# Patient Record
Sex: FEMALE | Race: WHITE
[De-identification: names, ages, dates, MRNs, and addresses within clinical notes are randomized per-mention and may not be internally consistent; named-entity substitution may affect disease eponyms.]

---

## 2017-08-24 ENCOUNTER — HOSPITAL ENCOUNTER (OUTPATIENT)
Dept: HOSPITAL 76 - DI.S | Age: 77
Discharge: HOME | End: 2017-08-24
Attending: PHYSICIAN ASSISTANT
Payer: MEDICARE

## 2017-08-24 DIAGNOSIS — Z12.31: Primary | ICD-10-CM

## 2017-08-24 PROCEDURE — 77067 SCR MAMMO BI INCL CAD: CPT

## 2017-08-25 NOTE — MAMMOGRAPHY REPORT
DIGITAL SCREENING MAMMOGRAM:  08/24/2017

 

CLINICAL INDICATION:  A 77-year-old for screening.

 

COMPARISON:  07/2016, 07/2015, 06/2014, 05/2013, 03/2012, 03/2011, 03/2010

 

TECHNIQUE:  Routine CC and MLO projections were obtained of the breasts.  

 

FINDINGS:  Parenchymal tissue within both breasts is heterogeneously dense, which may lower the sensi
tivity of mammography; however, there are no dominant masses, suspicious microcalcifications, or seco
ndary signs of malignancy. In comparison to the previous studies, there are no significant changes.

 

ASSESSMENT:  NO MAMMOGRAPHIC EVIDENCE OF MALIGNANCY. NO SIGNIFICANT INTERVAL CHANGES. 

 

RECOMMENDATION:  Screening mammography is recommended annually.

 

BIRADS category 1 - negative.

 

STANDARD QUALIFYING STATEMENTS

1. This examination was reviewed with the aid of Computed-Aided Detection (CAD).

2. A negative or benign imaging report should not delay biopsy if clinically suspicious findings are 
present. Consider surgical consultation if warranted. More than 5% of cancers are not identified by i
maging.

3. Dense breasts may obscure an underlying neoplasm.

 

 

 

DD:08/25/2017 13:01:47  DT: 08/25/2017 13:15  JOB #: [id number]  EXT JOB #:[id number]

## 2017-09-28 ENCOUNTER — HOSPITAL ENCOUNTER (OUTPATIENT)
Dept: HOSPITAL 76 - LAB.F | Age: 77
Discharge: HOME | End: 2017-09-28
Attending: PHYSICIAN ASSISTANT
Payer: MEDICARE

## 2017-09-28 DIAGNOSIS — R35.1: ICD-10-CM

## 2017-09-28 DIAGNOSIS — E78.5: ICD-10-CM

## 2017-09-28 DIAGNOSIS — Z51.81: Primary | ICD-10-CM

## 2017-09-28 DIAGNOSIS — E55.9: ICD-10-CM

## 2017-09-28 LAB
ALBUMIN/GLOB SERPL: 1.6 {RATIO} (ref 1–2.2)
ANION GAP SERPL CALCULATED.4IONS-SCNC: 6 MMOL/L (ref 6–13)
BASOPHILS NFR BLD AUTO: 0 10^3/UL (ref 0–0.1)
BASOPHILS NFR BLD AUTO: 0.6 %
BILIRUB BLD-MCNC: 0.7 MG/DL (ref 0.2–1)
BUN SERPL-MCNC: 15 MG/DL (ref 6–20)
CALCIUM UR-MCNC: 8.9 MG/DL (ref 8.5–10.3)
CHLORIDE SERPL-SCNC: 103 MMOL/L (ref 101–111)
CHOLEST SERPL-MCNC: 180 MG/DL
CO2 SERPL-SCNC: 30 MMOL/L (ref 21–32)
CREAT SERPLBLD-SCNC: 0.7 MG/DL (ref 0.4–1)
CUL URINE ADD CHARGE: (no result)
EOSINOPHIL # BLD AUTO: 0.3 10^3/UL (ref 0–0.7)
EOSINOPHIL NFR BLD AUTO: 5.8 %
ERYTHROCYTE [DISTWIDTH] IN BLOOD BY AUTOMATED COUNT: 12.5 % (ref 12–15)
GFRSERPLBLD MDRD-ARVRAT: 81 ML/MIN/{1.73_M2} (ref 89–?)
GLOBULIN SER-MCNC: 2.5 G/DL (ref 2.1–4.2)
GLUCOSE SERPL-MCNC: 80 MG/DL (ref 70–100)
HCT VFR BLD AUTO: 43.8 % (ref 37–47)
HDLC SERPL-MCNC: 71 MG/DL
HDLC SERPL: 2.5 {RATIO} (ref ?–4.4)
HGB UR QL STRIP: 14.9 G/DL (ref 12–16)
LDLC/HDLC SERPL: 1.4 {RATIO} (ref ?–4.4)
LYMPHOCYTES # SPEC AUTO: 1.2 10^3/UL (ref 1.5–3.5)
LYMPHOCYTES NFR BLD AUTO: 24.5 %
MCH RBC QN AUTO: 32.4 PG (ref 27–31)
MCHC RBC AUTO-ENTMCNC: 34.1 G/DL (ref 32–36)
MCV RBC AUTO: 95 FL (ref 81–99)
MONOCYTES # BLD AUTO: 0.4 10^3/UL (ref 0–1)
MONOCYTES NFR BLD AUTO: 8.6 %
NEUTROPHILS # BLD AUTO: 3 10^3/UL (ref 1.5–6.6)
NEUTROPHILS # SNV AUTO: 4.9 X10^3/UL (ref 4.8–10.8)
NEUTROPHILS NFR BLD AUTO: 60.5 %
NRBC # BLD AUTO: 0.2 /100WBC
PDW BLD AUTO: 8.4 FL (ref 7.9–10.8)
PH UR STRIP.AUTO: 7 PH (ref 5–7.5)
POTASSIUM SERPL-SCNC: 4.1 MMOL/L (ref 3.5–5)
PROT SPEC-MCNC: 6.5 G/DL (ref 6.7–8.2)
RBC MAR: 4.6 10^6/UL (ref 4.2–5.4)
SODIUM SERPLBLD-SCNC: 139 MMOL/L (ref 135–145)
SP GR UR STRIP.AUTO: 1.01 (ref 1–1.03)
TRIGL P FAST SERPL-MCNC: 47 MG/DL
UR CULTURE IF IND: (no result)
UROBILINOGEN UR STRIP.AUTO-MCNC: NEGATIVE MG/DL
VLDLC SERPL-SCNC: 9 MG/DL
WBC # BLD: 4.9 X10^3/UL
WBC # UR MANUAL: (no result) /HPF (ref 0–5)

## 2017-09-28 PROCEDURE — 87086 URINE CULTURE/COLONY COUNT: CPT

## 2017-09-28 PROCEDURE — 85025 COMPLETE CBC W/AUTO DIFF WBC: CPT

## 2017-09-28 PROCEDURE — 81001 URINALYSIS AUTO W/SCOPE: CPT

## 2017-09-28 PROCEDURE — 80053 COMPREHEN METABOLIC PANEL: CPT

## 2017-09-28 PROCEDURE — 36415 COLL VENOUS BLD VENIPUNCTURE: CPT

## 2017-09-28 PROCEDURE — 80061 LIPID PANEL: CPT

## 2017-09-28 PROCEDURE — 82306 VITAMIN D 25 HYDROXY: CPT

## 2017-10-05 ENCOUNTER — HOSPITAL ENCOUNTER (EMERGENCY)
Dept: HOSPITAL 76 - ED | Age: 77
Discharge: HOME | End: 2017-10-05
Payer: MEDICARE

## 2017-10-05 ENCOUNTER — HOSPITAL ENCOUNTER (OUTPATIENT)
Dept: HOSPITAL 76 - DI | Age: 77
Discharge: HOME | End: 2017-10-05
Attending: PHYSICIAN ASSISTANT
Payer: MEDICARE

## 2017-10-05 VITALS — DIASTOLIC BLOOD PRESSURE: 75 MMHG | SYSTOLIC BLOOD PRESSURE: 126 MMHG

## 2017-10-05 DIAGNOSIS — I45.2: ICD-10-CM

## 2017-10-05 DIAGNOSIS — M81.0: Primary | ICD-10-CM

## 2017-10-05 DIAGNOSIS — I48.0: Primary | ICD-10-CM

## 2017-10-05 LAB
ALBUMIN/GLOB SERPL: 1.9 {RATIO} (ref 1–2.2)
ANION GAP SERPL CALCULATED.4IONS-SCNC: 13 MMOL/L (ref 6–13)
BASOPHILS NFR BLD AUTO: 0 10^3/UL (ref 0–0.1)
BASOPHILS NFR BLD AUTO: 0.3 %
BILIRUB BLD-MCNC: 0.8 MG/DL (ref 0.2–1)
BUN SERPL-MCNC: 19 MG/DL (ref 6–20)
CALCIUM UR-MCNC: 9.4 MG/DL (ref 8.5–10.3)
CHLORIDE SERPL-SCNC: 104 MMOL/L (ref 101–111)
CO2 SERPL-SCNC: 23 MMOL/L (ref 21–32)
CREAT SERPLBLD-SCNC: 0.9 MG/DL (ref 0.4–1)
EOSINOPHIL # BLD AUTO: 0.1 10^3/UL (ref 0–0.7)
EOSINOPHIL NFR BLD AUTO: 1.8 %
ERYTHROCYTE [DISTWIDTH] IN BLOOD BY AUTOMATED COUNT: 12.6 % (ref 12–15)
GFRSERPLBLD MDRD-ARVRAT: 61 ML/MIN/{1.73_M2} (ref 89–?)
GLOBULIN SER-MCNC: 2.3 G/DL (ref 2.1–4.2)
GLUCOSE SERPL-MCNC: 150 MG/DL (ref 70–100)
HCT VFR BLD AUTO: 45.4 % (ref 37–47)
HGB UR QL STRIP: 15.3 G/DL (ref 12–16)
LIPASE SERPL-CCNC: 36 U/L (ref 22–51)
LYMPHOCYTES # SPEC AUTO: 1.2 10^3/UL (ref 1.5–3.5)
LYMPHOCYTES NFR BLD AUTO: 17.2 %
MCH RBC QN AUTO: 32.1 PG (ref 27–31)
MCHC RBC AUTO-ENTMCNC: 33.7 G/DL (ref 32–36)
MCV RBC AUTO: 95.2 FL (ref 81–99)
MONOCYTES # BLD AUTO: 0.4 10^3/UL (ref 0–1)
MONOCYTES NFR BLD AUTO: 5.6 %
NEUTROPHILS # BLD AUTO: 5.4 10^3/UL (ref 1.5–6.6)
NEUTROPHILS # SNV AUTO: 7.1 X10^3/UL (ref 4.8–10.8)
NEUTROPHILS NFR BLD AUTO: 75.1 %
NRBC # BLD AUTO: 0 /100WBC
PDW BLD AUTO: 8 FL (ref 7.9–10.8)
POTASSIUM SERPL-SCNC: 3.3 MMOL/L (ref 3.5–5)
PROT SPEC-MCNC: 6.7 G/DL (ref 6.7–8.2)
RBC MAR: 4.77 10^6/UL (ref 4.2–5.4)
SODIUM SERPLBLD-SCNC: 140 MMOL/L (ref 135–145)
WBC # BLD: 7.1 X10^3/UL

## 2017-10-05 PROCEDURE — 93005 ELECTROCARDIOGRAM TRACING: CPT

## 2017-10-05 PROCEDURE — 83735 ASSAY OF MAGNESIUM: CPT

## 2017-10-05 PROCEDURE — 80053 COMPREHEN METABOLIC PANEL: CPT

## 2017-10-05 PROCEDURE — 99284 EMERGENCY DEPT VISIT MOD MDM: CPT

## 2017-10-05 PROCEDURE — 77080 DXA BONE DENSITY AXIAL: CPT

## 2017-10-05 PROCEDURE — 85025 COMPLETE CBC W/AUTO DIFF WBC: CPT

## 2017-10-05 PROCEDURE — 36415 COLL VENOUS BLD VENIPUNCTURE: CPT

## 2017-10-05 PROCEDURE — 84443 ASSAY THYROID STIM HORMONE: CPT

## 2017-10-05 PROCEDURE — 96374 THER/PROPH/DIAG INJ IV PUSH: CPT

## 2017-10-05 PROCEDURE — 84484 ASSAY OF TROPONIN QUANT: CPT

## 2017-10-05 PROCEDURE — 83690 ASSAY OF LIPASE: CPT

## 2017-10-05 NOTE — ED PHYSICIAN DOCUMENTATION
PD HPI CHEST PAIN





- Stated complaint


Stated Complaint: CHEST TIGHTNESS/DIZZY





- Chief complaint


Chief Complaint: Cardiac





- History obtained from


History obtained from: Patient





- History of Present Illness


Timing - onset: Other (77-year-old woman was at [employer] today walking hard when 

she developed central nonradiating chest tightness with presyncope but no 

shortness of breath, nausea, or sweats.  It lasted for about 45 minutes and now 

feels better.  No heart history.  No recent travel, no dyspnea, no pedal edema 

or calf pain.)





Review of Systems


Constitutional: denies: Fever, Chills


Throat: denies: Dental pain / toothache, Sore throat


Cardiac: denies: Palpitations, Pedal edema, Calf pain


Respiratory: denies: Dyspnea, Cough, Hemoptysis, Wheezing





PD PAST MEDICAL HISTORY





- Present Medications


Home Medications: 


 Ambulatory Orders











 Medication  Instructions  Recorded  Confirmed


 


Acetaminophen [Tylenol Extra 1,000 mg PO DAILY 10/05/17 10/05/17





Strength]   


 


Atorvastatin [Lipitor] 20 mg PO DAILY 10/05/17 10/05/17


 


HYDROcod/ACETAM 5/325 [[place] 5/325]  10/05/17 


 


Ibuprofen [Advil] 400 mg PO DAILY 10/05/17 10/05/17














- Allergies


Allergies/Adverse Reactions: 


 Allergies











Allergy/AdvReac Type Severity Reaction Status Date / Time


 


No Known Drug Allergies Allergy   Verified 10/05/17 13:21














PD ED PE NORMAL





- Vitals


Vital signs reviewed: Yes





- General


General: Alert and oriented X 3, No acute distress





- Cardiac


Cardiac: RRR, No murmur, Other (On my examination she has a regular rate and 

rhythm despite being in atrial fibrillation on the EKG, this is converted to 

sinus borderline tachycardia with about 105 rate spontaneously.)





- Respiratory


Respiratory: No respiratory distress, Clear bilaterally





- Abdomen


Abdomen: Normal bowel sounds, Soft, Non tender





- Extremities


Extremities: No edema, No calf tenderness / cord





- Neuro


Neuro: Alert and oriented X 3, Normal speech





- Psych


Psych: Normal mood, Normal affect





Results





- Vitals


Vitals: 


 Vital Signs - 24 hr











  10/05/17 10/05/17 10/05/17





  13:11 14:10 14:51


 


Temperature 36.3 C L  


 


Heart Rate 100 80 81


 


Respiratory 16 20 14





Rate   


 


Blood Pressure 142/80 H 103/69 125/75


 


O2 Saturation 99 97 100














  10/05/17





  15:03


 


Temperature 


 


Heart Rate 81


 


Respiratory 18





Rate 


 


Blood Pressure 126/75


 


O2 Saturation 100








 Oxygen











O2 Source                      Room air

















- EKG (time done)


  ** 1334


Rate: Rate (enter#) (127)


Rhythm: Atrial fibrillation


Intervals: RBBB


Ischemia: Non specific changes


Compare to prior EKG: Old EKG unavailable


Computer interpretation: Agree with computer





  ** 1405


Rate: Rate (enter#) (83)


Rhythm: NSR


Intervals: Prolonged AL, RBBB, LBBB (LAFB)


Ischemia: No: ST elevation c/w ischemia


Computer interpretation: Agree with computer





- Labs


Labs: 


 Laboratory Tests











  10/05/17 10/05/17 10/05/17





  13:38 13:38 13:38


 


WBC  7.1  


 


RBC  4.77  


 


Hgb  15.3  


 


Hct  45.4  


 


MCV  95.2  


 


MCH  32.1 H  


 


MCHC  33.7  


 


RDW  12.6  


 


Plt Count  222  


 


MPV  8.0  


 


Neut #  5.4  


 


Lymph #  1.2 L  


 


Mono #  0.4  


 


Eos #  0.1  


 


Baso #  0.0  


 


Absolute Nucleated RBC  0.00  


 


Nucleated RBC %  0.0  


 


Sodium   140 


 


Potassium   3.3 L 


 


Chloride   104 


 


Carbon Dioxide   23 


 


Anion Gap   13.0 


 


BUN   19 


 


Creatinine   0.9 


 


Estimated GFR (MDRD)   61 L 


 


Glucose   150 H 


 


Calcium   9.4 


 


Magnesium    2.2


 


Total Bilirubin   0.8 


 


AST   35 


 


ALT   23 


 


Alkaline Phosphatase   59 


 


Troponin I   


 


Total Protein   6.7 


 


Albumin   4.4 


 


Globulin   2.3 


 


Albumin/Globulin Ratio   1.9 


 


Lipase   36 


 


TSH   














  10/05/17 10/05/17





  13:38 13:38


 


WBC  


 


RBC  


 


Hgb  


 


Hct  


 


MCV  


 


MCH  


 


MCHC  


 


RDW  


 


Plt Count  


 


MPV  


 


Neut #  


 


Lymph #  


 


Mono #  


 


Eos #  


 


Baso #  


 


Absolute Nucleated RBC  


 


Nucleated RBC %  


 


Sodium  


 


Potassium  


 


Chloride  


 


Carbon Dioxide  


 


Anion Gap  


 


BUN  


 


Creatinine  


 


Estimated GFR (MDRD)  


 


Glucose  


 


Calcium  


 


Magnesium  


 


Total Bilirubin  


 


AST  


 


ALT  


 


Alkaline Phosphatase  


 


Troponin I  < 0.04 


 


Total Protein  


 


Albumin  


 


Globulin  


 


Albumin/Globulin Ratio  


 


Lipase  


 


TSH   1.51














PD MEDICAL DECISION MAKING





- ED course


ED course: 





77-year-old woman with atrial fibrillation episode causing chest tightness and 

dizziness which has resolved.





Repeat EKG shows sinus rhythm, she was modestly tachycardic, she was 

administered 5 mg of Lopressor IV and her heart rate was about 80.





DVT/PE was considered, but given alternative diagnosis of intermittent atrial 

fibrillation and lack of dyspnea this is very unlikely.





Potassium was repleted orally.





Departure





- Departure


Disposition: 01 Home, Self Care


Clinical Impression: 


Atrial fibrillation


Qualifiers:


 Atrial fibrillation type: paroxysmal Qualified Code(s): I48.0 - Paroxysmal 

atrial fibrillation





Condition: Good


Record reviewed to determine appropriate education?: Yes


Instructions:  Atrial Fibrillation Dc


Comments: 


Follow-up with your physician for further evaluation and treatment, return if 

worse.  Take a baby aspirin a day until you follow-up with your physician.


Discharge Date/Time: 10/05/17 15:07

## 2017-10-05 NOTE — DEXA REPORT
DEXA SCAN:  10/05/2017 



CLINICAL INDICATION: Postmenopausal.



TECHNIQUE: Dual energy x-ray absorptiometry (DXA) was performed on a 800APP 
system. Regions measured are the AP spine, femoral neck, and, if needed, 
forearm.



COMPARISON: None. In accordance with the International Society for Clinical 
Densitometry (ISCD) guidelines, data from previous exams may be reanalyzed 
using current recommendations and techniques. This is done to allow a more 
accurate basis for comparison with the current study.



FINDINGS

The data for the lumbar spine is as follows:







REGION BMD (g/cm/cm) T-SCORE Z-SCORE

 

L1 1.006 -1.0 1.0

 

L2 0.997 -1.7 0.4

 

L3 0.910 -2.4 -0.4

 

L4 1.051 -1.2 0.8

 

TOTAL 0.989 -1.6 0.5







NOTE: All evaluable vertebrae are used for classification.



The data for the hip is as follows:







REGION BMD (g/cm/cm) T-SCORE Z-SCORE

 

Neck 0.666 -2.7 -0.5

 

TOTAL 0.698 -2.5 -0.4





NOTE: The femoral neck or total proximal femur, whichever is lowest, is used 
for classification.



IMPRESSION: THE WHO CLASSIFICATION BASED ON THE INTERNATIONAL REFERENCE 
STANDARD IS OSTEOPOROSIS. THE FRACTURE RISK IS HIGH.



RECOMMENDATION: Patients with diagnosis of osteoporosis or osteopenia should 
have regular bone mineral density assessment. For those eligible for Medicare, 
routine testing is allowed once every 2 years. Testing frequency can be 
increased for patients who have rapidly progressing disease or for those who 
are receiving medical therapy to restore bone mass. 



COMMENT: World Health Organization (WHO) definitions for osteoporosis and 
osteopenia:

NORMAL BMD: T-score at -1.0 or higher, fracture risk is low.

OSTEOPENIA BMD: T-score between -1.0 and -2.5, fracture risk is increased.

OSTEOPOROSIS BMD: T-score at -2.5 or lower, fracture risk high.



National Osteoporosis Foundation recommends:

1. Obtain adequate dietary calcium (at least 1200 mg per day) and vitamin D (400
-800 international units per day).

2. Participate, as appropriate, in regular weightbearing and muscle-
strengthening exercise. 

3. Avoid tobacco use and reduce alcohol and caffeine intake. 

4. For more detailed information see the website at www.NOF.org.
MTDD

## 2018-05-27 ENCOUNTER — HOSPITAL ENCOUNTER (EMERGENCY)
Dept: HOSPITAL 76 - ED | Age: 78
Discharge: HOME | End: 2018-05-27
Payer: MEDICARE

## 2018-05-27 VITALS — SYSTOLIC BLOOD PRESSURE: 144 MMHG | DIASTOLIC BLOOD PRESSURE: 74 MMHG
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
## 2018-05-27 DIAGNOSIS — S90.122A: Primary | ICD-10-CM

## 2018-05-27 DIAGNOSIS — E78.00: ICD-10-CM

## 2018-05-27 PROCEDURE — 73660 X-RAY EXAM OF TOE(S): CPT

## 2018-05-27 PROCEDURE — 99282 EMERGENCY DEPT VISIT SF MDM: CPT

## 2018-05-27 NOTE — ED PHYSICIAN DOCUMENTATION
PD HPI LOWER EXT INJURY





- Stated complaint


Stated Complaint: L FOOT INJ





- Chief complaint


Chief Complaint: Ext Problem





- History obtained from


History obtained from: Patient





- History of Present Illness


PD HPI LOW EXT INJURY LOCATION: Left, Toe (middle)


Type of injury: Blunt / blow


Where injury occurred: Home


Timing - onset: How many weeks ago (1)


Timing - duration: Weeks (1)


Timing - details: Abrupt onset, Still present


Improved by: Rest, Ice, Immobilization


Worsened by: Moving, Palpating


Associated symptoms: Swelling, Discolored


Contributing factors: No: Anticoagulated


Similar symptoms before: Has not had sx before


Recently seen: Not recently seen





- Additional information


Additional information: 


77-year-old female stubbed her toe one week ago she had a lot of swelling and 

pain associated with this and she has been able to walk on it she continues to 

have pain and now she is worried that maybe there is a fracture there.  She was 

expecting the pain to be resolved by now.








Review of Systems


Constitutional: denies: Fever


Respiratory: denies: Cough


GI: denies: Vomiting


Musculoskeletal: reports: Joint pain, Pain with weight bearing


Neurologic: denies: Generalized weakness, Focal weakness, Numbness





PD PAST MEDICAL HISTORY





- Past Medical History


Cardiovascular: High cholesterol





- Present Medications


Home Medications: 


 Ambulatory Orders











 Medication  Instructions  Recorded  Confirmed


 


Acetaminophen [Tylenol Extra 1,000 mg PO DAILY 10/05/17 10/05/17





Strength]   


 


Atorvastatin [Lipitor] 20 mg PO DAILY 10/05/17 10/05/17


 


HYDROcod/ACETAM 5/325 [[place] 5/325]  10/05/17 


 


Aspirin 81 mg PO 05/27/18 


 


Baclofen 10 mg PO 05/27/18 


 


Melatonin 3 mg PO 05/27/18 


 


Meriva 500 05/27/18 


 


Multivitamin [Multivitamins] 1 each PO 05/27/18 


 


Oxybutynin [Ditropan] 5 mg PO BID 05/27/18 05/27/18














- Allergies


Allergies/Adverse Reactions: 


 Allergies











Allergy/AdvReac Type Severity Reaction Status Date / Time


 


Sulfa (Sulfonamide Allergy  Nausea Verified 05/27/18 10:13





Antibiotics)     














- Social History


Does the pt smoke?: No


Smoking Status: Never smoker





PD ED PE NORMAL





- Vitals


Vital signs reviewed: Yes (normal )





- General


General: Alert and oriented X 3, No acute distress, Well developed/nourished





- HEENT


HEENT: Atraumatic, PERRL, EOMI





- Neck


Neck: Supple, no meningeal sign





- Respiratory


Respiratory: No respiratory distress





- Derm


Derm: Normal color, Warm and dry, No rash





- Extremities


Extremities: Other (there is ecchymosis to the 3rd toe on the left foot this 

extends to the dorsum of the foot. There is corresponding tenderness and no 

crepitance. distal n/v is intact. )





- Neuro


Neuro: No motor deficit, No sensory deficit


Eye Opening: Spontaneous


Motor: Obeys Commands


Verbal: Oriented


GCS Score: 15





- Psych


Psych: Normal mood, Normal affect





Results





- Vitals


Vitals: 


 Vital Signs - 24 hr











  05/27/18





  10:10


 


Temperature 36.3 C L


 


Heart Rate 65


 


Respiratory 18





Rate 


 


Blood Pressure 144/74 H


 


O2 Saturation 99








 Oxygen











O2 Source                      Room air

















- Rads (name of study)


  ** toes


Radiology: Prelim report reviewed (Impression: 1.  No acute osseous abnormality 

2. mild DJD, osteopenia.), EMP read indepedently, See rad report





PD MEDICAL DECISION MAKING





- ED course


Complexity details: reviewed results, re-evaluated patient, considered 

differential, d/w patient, d/w family


ED course: 


77-year-old female who stubbed her toe still has pain is concerned about a 

fracture satisfied that she does not have a fracture after reviewing her films.

  She states that she feels that she can walk on this adequately and that she 

does not need pain medication.








Departure





- Departure


Disposition: 01 Home, Self Care


Clinical Impression: 


Toe contusion


Qualifiers:


 Encounter type: initial encounter Toe: lesser toe Damage to nail status: 

without damage Laterality: left Qualified Code(s): S90.122A - Contusion of left 

lesser toe(s) without damage to nail, initial encounter





Condition: Stable


Instructions:  ED Contusion Foot


Follow-Up: 


Paris Ramsay PA-C [Primary Care Provider] - 


Discharge Date/Time: 05/27/18 11:27

## 2018-05-27 NOTE — XRAY PRELIMINARY REPORT
Accession: [id number]

Exam: XR TOE(S) LT

 

IMPRESSION: 

1. No acute osseous abnormality. 

2. Mild DJD. Osteopenia.

 

[facility]

 

SITE ID: [id number]

## 2018-05-27 NOTE — XRAY REPORT
EXAM:

LEFT TOE RADIOGRAPHY

 

EXAM DATE: 5/27/2018 10:38 AM.

 

CLINICAL HISTORY: Trauma.

 

COMPARISON: None.

 

TECHNIQUE: 3 views.

 

FINDINGS: 

Bones: No fractures or bone lesions. Bones appear osteopenic.

 

Joints: Mild DJD at the small joints of the foot.

 

Soft Tissues: Unremarkable.

 

IMPRESSION: 

1. No acute osseous abnormality. 

2. Mild DJD. Osteopenia.

 

[facility]

Referring Provider Line: [phone]

 

SITE ID: [id number]

## 2018-06-05 ENCOUNTER — HOSPITAL ENCOUNTER (OUTPATIENT)
Dept: HOSPITAL 76 - LAB.F | Age: 78
Discharge: HOME | End: 2018-06-05
Attending: PHYSICIAN ASSISTANT
Payer: MEDICARE

## 2018-06-05 DIAGNOSIS — E78.5: ICD-10-CM

## 2018-06-05 DIAGNOSIS — E55.9: ICD-10-CM

## 2018-06-05 DIAGNOSIS — R68.83: ICD-10-CM

## 2018-06-05 DIAGNOSIS — R53.83: ICD-10-CM

## 2018-06-05 DIAGNOSIS — E87.6: Primary | ICD-10-CM

## 2018-06-05 LAB
ALBUMIN DIAFP-MCNC: 3.6 G/DL (ref 3.2–5.5)
ALBUMIN/GLOB SERPL: 1.5 {RATIO} (ref 1–2.2)
ALP SERPL-CCNC: 51 IU/L (ref 42–121)
ALT SERPL W P-5'-P-CCNC: 21 IU/L (ref 10–60)
ANION GAP SERPL CALCULATED.4IONS-SCNC: 6 MMOL/L (ref 6–13)
AST SERPL W P-5'-P-CCNC: 26 IU/L (ref 10–42)
BASOPHILS NFR BLD AUTO: 0 10^3/UL (ref 0–0.1)
BASOPHILS NFR BLD AUTO: 1.1 %
BILIRUB BLD-MCNC: 0.9 MG/DL (ref 0.2–1)
BUN SERPL-MCNC: 19 MG/DL (ref 6–20)
CALCIUM UR-MCNC: 8.6 MG/DL (ref 8.5–10.3)
CHLORIDE SERPL-SCNC: 101 MMOL/L (ref 101–111)
CHOLEST SERPL-MCNC: 176 MG/DL
CO2 SERPL-SCNC: 28 MMOL/L (ref 21–32)
CREAT SERPLBLD-SCNC: 0.7 MG/DL (ref 0.4–1)
EOSINOPHIL # BLD AUTO: 0.2 10^3/UL (ref 0–0.7)
EOSINOPHIL NFR BLD AUTO: 5.2 %
ERYTHROCYTE [DISTWIDTH] IN BLOOD BY AUTOMATED COUNT: 12.6 % (ref 12–15)
GFRSERPLBLD MDRD-ARVRAT: 81 ML/MIN/{1.73_M2} (ref 89–?)
GLOBULIN SER-MCNC: 2.4 G/DL (ref 2.1–4.2)
GLUCOSE SERPL-MCNC: 82 MG/DL (ref 70–100)
HDLC SERPL-MCNC: 58 MG/DL
HDLC SERPL: 3 {RATIO} (ref ?–4.4)
HGB UR QL STRIP: 14.4 G/DL (ref 12–16)
LDLC SERPL CALC-MCNC: 103 MG/DL
LDLC/HDLC SERPL: 1.8 {RATIO} (ref ?–4.4)
LYMPHOCYTES # SPEC AUTO: 0.9 10^3/UL (ref 1.5–3.5)
LYMPHOCYTES NFR BLD AUTO: 20.3 %
MCH RBC QN AUTO: 33.3 PG (ref 27–31)
MCHC RBC AUTO-ENTMCNC: 34.3 G/DL (ref 32–36)
MCV RBC AUTO: 97.3 FL (ref 81–99)
MONOCYTES # BLD AUTO: 0.4 10^3/UL (ref 0–1)
MONOCYTES NFR BLD AUTO: 9.9 %
NEUTROPHILS # BLD AUTO: 2.7 10^3/UL (ref 1.5–6.6)
NEUTROPHILS # SNV AUTO: 4.3 X10^3/UL (ref 4.8–10.8)
NEUTROPHILS NFR BLD AUTO: 63.5 %
PDW BLD AUTO: 8.9 FL (ref 7.9–10.8)
PLATELET # BLD: 207 10^3/UL (ref 130–450)
PROT SPEC-MCNC: 6 G/DL (ref 6.7–8.2)
RBC MAR: 4.32 10^6/UL (ref 4.2–5.4)
SODIUM SERPLBLD-SCNC: 135 MMOL/L (ref 135–145)
VLDLC SERPL-SCNC: 15 MG/DL

## 2018-06-05 PROCEDURE — 36415 COLL VENOUS BLD VENIPUNCTURE: CPT

## 2018-06-05 PROCEDURE — 80061 LIPID PANEL: CPT

## 2018-06-05 PROCEDURE — 85025 COMPLETE CBC W/AUTO DIFF WBC: CPT

## 2018-06-05 PROCEDURE — 83721 ASSAY OF BLOOD LIPOPROTEIN: CPT

## 2018-06-05 PROCEDURE — 80053 COMPREHEN METABOLIC PANEL: CPT

## 2018-06-05 PROCEDURE — 84443 ASSAY THYROID STIM HORMONE: CPT

## 2018-06-05 PROCEDURE — 82306 VITAMIN D 25 HYDROXY: CPT

## 2018-06-26 ENCOUNTER — HOSPITAL ENCOUNTER (OUTPATIENT)
Dept: HOSPITAL 76 - DI | Age: 78
Discharge: HOME | End: 2018-06-26
Attending: PODIATRIST
Payer: MEDICARE

## 2018-06-26 DIAGNOSIS — S99.922A: Primary | ICD-10-CM

## 2018-06-26 NOTE — XRAY REPORT
Procedure Date:  06/26/2018   

Accession Number:  [id number] / [id number]                    

Procedure:  XR  - Foot 3 View LT CPT Code:  

 

FULL RESULT:

 

 

EXAM: Foot 3 View LT

 

DATE: 6/26/2018 3:47 PM

 

CLINICAL HISTORY: INJURY 3RD TOE LEFT, 3RD MT NECK PAIN LEFT

 

COMPARISON: Left toes 5/27/2018

 

TECHNIQUE: 3 views.

 

FINDINGS:

Bones: There is faint sclerosis across the distal shaft of the proximal 

phalanx of the third toe, compatible with a healing, nondisplaced 

fracture.

 

Joints: Mild degenerative changes, stable.

 

Soft Tissues: Normal. No soft tissue swelling.

 

IMPRESSION:

Faint sclerosis across the distal end of the proximal phalanx of the 

third toe, compatible with a healing nondisplaced fracture.

 

[facility]

## 2018-10-26 ENCOUNTER — HOSPITAL ENCOUNTER (OUTPATIENT)
Dept: HOSPITAL 76 - LAB.F | Age: 78
Discharge: HOME | End: 2018-10-26
Attending: PHYSICIAN ASSISTANT
Payer: MEDICARE

## 2018-10-26 DIAGNOSIS — R89.9: Primary | ICD-10-CM

## 2018-10-26 LAB
BASOPHILS NFR BLD AUTO: 0 10^3/UL (ref 0–0.1)
BASOPHILS NFR BLD AUTO: 0.5 %
EOSINOPHIL # BLD AUTO: 0.1 10^3/UL (ref 0–0.7)
EOSINOPHIL NFR BLD AUTO: 1.6 %
ERYTHROCYTE [DISTWIDTH] IN BLOOD BY AUTOMATED COUNT: 12.2 % (ref 12–15)
HGB UR QL STRIP: 14.7 G/DL (ref 12–16)
LYMPHOCYTES # SPEC AUTO: 1.2 10^3/UL (ref 1.5–3.5)
LYMPHOCYTES NFR BLD AUTO: 20.5 %
MCH RBC QN AUTO: 33.7 PG (ref 27–31)
MCHC RBC AUTO-ENTMCNC: 33.9 G/DL (ref 32–36)
MCV RBC AUTO: 99.2 FL (ref 81–99)
MONOCYTES # BLD AUTO: 0.5 10^3/UL (ref 0–1)
MONOCYTES NFR BLD AUTO: 8.1 %
NEUTROPHILS # BLD AUTO: 4 10^3/UL (ref 1.5–6.6)
NEUTROPHILS # SNV AUTO: 5.8 X10^3/UL (ref 4.8–10.8)
NEUTROPHILS NFR BLD AUTO: 69.3 %
PDW BLD AUTO: 8.8 FL (ref 7.9–10.8)
PLATELET # BLD: 200 10^3/UL (ref 130–450)
RBC MAR: 4.38 10^6/UL (ref 4.2–5.4)

## 2018-10-26 PROCEDURE — 36415 COLL VENOUS BLD VENIPUNCTURE: CPT

## 2018-10-26 PROCEDURE — 85025 COMPLETE CBC W/AUTO DIFF WBC: CPT

## 2019-03-29 ENCOUNTER — HOSPITAL ENCOUNTER (OUTPATIENT)
Dept: HOSPITAL 76 - LAB.F | Age: 79
Discharge: HOME | End: 2019-03-29
Attending: PHYSICIAN ASSISTANT
Payer: MEDICARE

## 2019-03-29 DIAGNOSIS — E78.5: Primary | ICD-10-CM

## 2019-03-29 LAB
CHOLEST SERPL-MCNC: 174 MG/DL
HDLC SERPL-MCNC: 65 MG/DL
HDLC SERPL: 2.7 {RATIO} (ref ?–4.4)
LDLC SERPL CALC-MCNC: 96 MG/DL
LDLC/HDLC SERPL: 1.5 {RATIO} (ref ?–4.4)
VLDLC SERPL-SCNC: 13 MG/DL

## 2019-03-29 PROCEDURE — 36415 COLL VENOUS BLD VENIPUNCTURE: CPT

## 2019-03-29 PROCEDURE — 80061 LIPID PANEL: CPT

## 2019-03-29 PROCEDURE — 83721 ASSAY OF BLOOD LIPOPROTEIN: CPT

## 2019-06-05 ENCOUNTER — HOSPITAL ENCOUNTER (OUTPATIENT)
Dept: HOSPITAL 76 - LAB.F | Age: 79
Discharge: HOME | End: 2019-06-05
Attending: PHYSICIAN ASSISTANT
Payer: MEDICARE

## 2019-06-05 DIAGNOSIS — E78.5: Primary | ICD-10-CM

## 2019-06-05 LAB
ALBUMIN DIAFP-MCNC: 3.8 G/DL (ref 3.2–5.5)
ALBUMIN/GLOB SERPL: 1.7 {RATIO} (ref 1–2.2)
ALP SERPL-CCNC: 57 IU/L (ref 42–121)
ALT SERPL W P-5'-P-CCNC: 23 IU/L (ref 10–60)
ANION GAP SERPL CALCULATED.4IONS-SCNC: 7 MMOL/L (ref 6–13)
AST SERPL W P-5'-P-CCNC: 28 IU/L (ref 10–42)
BASOPHILS NFR BLD AUTO: 0 10^3/UL (ref 0–0.1)
BASOPHILS NFR BLD AUTO: 0.4 %
BILIRUB BLD-MCNC: 0.8 MG/DL (ref 0.2–1)
BUN SERPL-MCNC: 14 MG/DL (ref 6–20)
CALCIUM UR-MCNC: 9.1 MG/DL (ref 8.5–10.3)
CHLORIDE SERPL-SCNC: 104 MMOL/L (ref 101–111)
CHOLEST SERPL-MCNC: 189 MG/DL
CO2 SERPL-SCNC: 28 MMOL/L (ref 21–32)
CREAT SERPLBLD-SCNC: 0.7 MG/DL (ref 0.4–1)
EOSINOPHIL # BLD AUTO: 0.2 10^3/UL (ref 0–0.7)
EOSINOPHIL NFR BLD AUTO: 3.4 %
ERYTHROCYTE [DISTWIDTH] IN BLOOD BY AUTOMATED COUNT: 12.5 % (ref 12–15)
GFRSERPLBLD MDRD-ARVRAT: 81 ML/MIN/{1.73_M2} (ref 89–?)
GLOBULIN SER-MCNC: 2.3 G/DL (ref 2.1–4.2)
GLUCOSE SERPL-MCNC: 90 MG/DL (ref 70–100)
HDLC SERPL-MCNC: 72 MG/DL
HDLC SERPL: 2.6 {RATIO} (ref ?–4.4)
HGB UR QL STRIP: 14.8 G/DL (ref 12–16)
LDLC SERPL CALC-MCNC: 101 MG/DL
LDLC/HDLC SERPL: 1.4 {RATIO} (ref ?–4.4)
LYMPHOCYTES # SPEC AUTO: 0.9 10^3/UL (ref 1.5–3.5)
LYMPHOCYTES NFR BLD AUTO: 18.4 %
MCH RBC QN AUTO: 33.3 PG (ref 27–31)
MCHC RBC AUTO-ENTMCNC: 33.8 G/DL (ref 32–36)
MCV RBC AUTO: 98.4 FL (ref 81–99)
MONOCYTES # BLD AUTO: 0.4 10^3/UL (ref 0–1)
MONOCYTES NFR BLD AUTO: 8.3 %
NEUTROPHILS # BLD AUTO: 3.4 10^3/UL (ref 1.5–6.6)
NEUTROPHILS # SNV AUTO: 4.8 X10^3/UL (ref 4.8–10.8)
NEUTROPHILS NFR BLD AUTO: 69.5 %
PDW BLD AUTO: 8.8 FL (ref 7.9–10.8)
PLATELET # BLD: 188 10^3/UL (ref 130–450)
PROT SPEC-MCNC: 6.1 G/DL (ref 6.7–8.2)
RBC MAR: 4.45 10^6/UL (ref 4.2–5.4)
SODIUM SERPLBLD-SCNC: 139 MMOL/L (ref 135–145)
VLDLC SERPL-SCNC: 16 MG/DL

## 2019-06-05 PROCEDURE — 80061 LIPID PANEL: CPT

## 2019-06-05 PROCEDURE — 85025 COMPLETE CBC W/AUTO DIFF WBC: CPT

## 2019-06-05 PROCEDURE — 80053 COMPREHEN METABOLIC PANEL: CPT

## 2019-06-05 PROCEDURE — 36415 COLL VENOUS BLD VENIPUNCTURE: CPT

## 2019-06-05 PROCEDURE — 83721 ASSAY OF BLOOD LIPOPROTEIN: CPT
